# Patient Record
Sex: MALE | NOT HISPANIC OR LATINO | URBAN - METROPOLITAN AREA
[De-identification: names, ages, dates, MRNs, and addresses within clinical notes are randomized per-mention and may not be internally consistent; named-entity substitution may affect disease eponyms.]

---

## 2017-08-20 ENCOUNTER — CHARTING TRANS (OUTPATIENT)
Dept: URGENT CARE | Age: 22
End: 2017-08-20

## 2017-08-20 ENCOUNTER — LAB SERVICES (OUTPATIENT)
Dept: OTHER | Age: 22
End: 2017-08-20

## 2017-08-20 LAB — DEPRECATED S PYO AG THROAT QL EIA: NEGATIVE

## 2018-04-24 PROBLEM — F19.20 POLYSUBSTANCE DEPENDENCE (HCC): Status: ACTIVE | Noted: 2018-04-24

## 2018-04-24 PROBLEM — F32.9 MAJOR DEPRESSION: Status: ACTIVE | Noted: 2018-04-24

## 2018-04-24 NOTE — ED NOTES
AGUSTÍN accessor opened up seclusion bag in front of PT, PT gave his father his wallet and cell phone to take home. Bag was resealed in front of patient once these two items were removed from the bag.

## 2018-04-24 NOTE — ED PROVIDER NOTES
Patient Seen in: [facility] Emergency Department    History   Patient presents with:  Eval-P (psychiatric)    Stated Complaint: eval p si poss etoh    HPI    Patient presents suicidal ideation with plan to hang himself with a belt.   Patient states t Pulmonary/Chest: Effort normal. No respiratory distress. Abdominal: Soft. He exhibits no distension. There is no tenderness. Musculoskeletal: Normal range of motion. He exhibits no tenderness.    Neurological: He is alert and oriented to person, place

## 2018-04-24 NOTE — ED INITIAL ASSESSMENT (HPI)
22YM c/c of kaela MATOS Pt state that he here tonight for SI thoughts Pt state that been drinking tonight too.

## 2018-04-24 NOTE — ED NOTES
Report given to [facility], RADHA called with ETA of 30-45 minutes.

## 2018-04-24 NOTE — BH LEVEL OF CARE ASSESSMENT
Level of Care Assessment Note    General Questions  Why are you here?: \"Bad day\", SI  Precipitating Events: Pt reports he was with a friend today and had been drinking alcohol and they started talking about his previous relationship and started feeling \ statements. Pt father reports only change in bx was that he was withdrawn today.    Family's Biggest Areas of Concern: \"I dont want my son to hurt himself\"     Referral Source  Referral Source: Friend/Relative  Referral Source Info: Pt brought in by shana Rehearsal: Yes  Date of Past Suicide Rehearsal:  (Unable to recall exact date )  Describe Past Suicide Rehearsal: Pt reports hx of setting up belt in his basement with intent to hang himself   Past Suicide Attempt: Yes  Date of Past Suicide Attempt: 07/01/ increase the pain. Present Self-Injurious Behaviors: No    Mental Health Symptoms  Hallucination Type: No problems reported or observed  Delusions: No problems reported or observed  Depression Symptoms: Appetite change; Change in energy level;Crying; Edgard Rebolledo of use?: daily smoking marijuana since  for 2 years   Last Use?: 4/22  Longest period of sobriety/abstinence?: 14 days   Is your current use the most/worst it has ever been? : Yes                                  Illicit Opioid Use  Age at first use?: 14 makes you feel unsafe?: No  Have You Ever Been Harmed by a Partner/Caregiver?: No  Health Concerns r/t Abuse: No  Possible Abuse Reportable to[de-identified] Not appropriate for reporting to authorities    General Appearance  Characteristics: Appropriate clothing  Eye

## 2018-04-24 NOTE — ED PROVIDER NOTES
Update note from Dr. Elizabeth Pack at 5:30 AM.  Patient has been evaluated by [facility] and accepted for admission there. He remains calm and cooperative here.

## 2018-05-17 ENCOUNTER — LAB SERVICES (OUTPATIENT)
Dept: OTHER | Age: 23
End: 2018-05-17

## 2018-05-17 ENCOUNTER — CHARTING TRANS (OUTPATIENT)
Dept: OTHER | Age: 23
End: 2018-05-17

## 2018-05-17 LAB
HIV 1/2 ANTIBODY: NORMAL
HIV-1 P24 ANTIGEN: NORMAL

## 2018-05-17 ASSESSMENT — PAIN SCALES - GENERAL: PAINLEVEL_OUTOF10: 0

## 2018-05-18 LAB
C TRACH DNA SPEC QL NAA+PROBE: NEGATIVE
N GONORRHOEA DNA SPEC QL NAA+PROBE: NEGATIVE

## 2018-05-20 LAB — RPR SER QL: NORMAL

## 2018-05-21 LAB
HSV IGM SER-ACNC: 1.64
HSV1 IGG SERPL QL IA: POSITIVE
HSV2 IGG SERPL QL IA: POSITIVE

## 2018-11-28 VITALS
TEMPERATURE: 97.8 F | RESPIRATION RATE: 16 BRPM | DIASTOLIC BLOOD PRESSURE: 74 MMHG | HEART RATE: 104 BPM | SYSTOLIC BLOOD PRESSURE: 138 MMHG | OXYGEN SATURATION: 97 %

## 2019-03-06 VITALS
RESPIRATION RATE: 20 BRPM | HEART RATE: 99 BPM | OXYGEN SATURATION: 97 % | DIASTOLIC BLOOD PRESSURE: 82 MMHG | TEMPERATURE: 98.3 F | SYSTOLIC BLOOD PRESSURE: 132 MMHG